# Patient Record
Sex: MALE | Race: BLACK OR AFRICAN AMERICAN | Employment: STUDENT | URBAN - METROPOLITAN AREA
[De-identification: names, ages, dates, MRNs, and addresses within clinical notes are randomized per-mention and may not be internally consistent; named-entity substitution may affect disease eponyms.]

---

## 2019-02-13 ENCOUNTER — HOSPITAL ENCOUNTER (EMERGENCY)
Facility: HOSPITAL | Age: 24
Discharge: HOME | End: 2019-02-13
Attending: EMERGENCY MEDICINE
Payer: MEDICARE

## 2019-02-13 VITALS
DIASTOLIC BLOOD PRESSURE: 64 MMHG | RESPIRATION RATE: 16 BRPM | WEIGHT: 145 LBS | OXYGEN SATURATION: 98 % | HEART RATE: 107 BPM | TEMPERATURE: 99 F | SYSTOLIC BLOOD PRESSURE: 130 MMHG

## 2019-02-13 DIAGNOSIS — J02.0 STREP PHARYNGITIS: Primary | ICD-10-CM

## 2019-02-13 LAB — S PYO AG THROAT QL: POSITIVE

## 2019-02-13 PROCEDURE — 99283 EMERGENCY DEPT VISIT LOW MDM: CPT

## 2019-02-13 PROCEDURE — 87430 STREP A AG IA: CPT

## 2019-02-13 RX ORDER — HYDROCODONE BITARTRATE AND ACETAMINOPHEN 7.5; 325 MG/1; MG/1
1 TABLET ORAL EVERY 6 HOURS PRN
COMMUNITY

## 2019-02-13 RX ORDER — AMOXICILLIN 875 MG/1
875 TABLET, COATED ORAL 2 TIMES DAILY
Qty: 14 TABLET | Refills: 0 | Status: SHIPPED | OUTPATIENT
Start: 2019-02-13 | End: 2019-02-20

## 2019-02-13 RX ORDER — PENICILLIN V POTASSIUM 500 MG/1
500 TABLET ORAL 4 TIMES DAILY
COMMUNITY

## 2019-02-13 RX ORDER — HYDROXYUREA 500 MG/1
30 CAPSULE ORAL DAILY
COMMUNITY

## 2019-02-13 RX ORDER — IBUPROFEN 600 MG/1
600 TABLET ORAL EVERY 8 HOURS PRN
Qty: 30 TABLET | Refills: 0 | Status: SHIPPED | OUTPATIENT
Start: 2019-02-13

## 2019-02-13 RX ORDER — NAPROXEN 500 MG/1
500 TABLET ORAL 2 TIMES DAILY WITH MEALS
COMMUNITY

## 2019-02-13 RX ORDER — MELATONIN
400 DAILY
COMMUNITY

## 2019-02-14 NOTE — ED NOTES
Pt presents to ed for sore throat and chills for the past 2 days. Pt tested positive for strep in triage.

## 2019-02-14 NOTE — ED PROVIDER NOTES
Patient Seen in: [facility]    History   Patient presents with:  Sore Throat      HPI    Patient presents to the ED complaining of a sore throat times 2 days. Pain is severe and constant, worse with swallowing.   No other comp Soft. He exhibits no distension. Neurological: He is alert and oriented to person, place, and time. Skin: Skin is warm and dry. Psychiatric: He has a normal mood and affect. His behavior is normal.   Nursing note and vitals reviewed.       ED Course Medication List as of 2/13/2019 10:46 PM    START taking these medications    amoxicillin 875 MG Oral Tab  Take 1 tablet (875 mg total) by mouth 2 (two) times daily for 7 days. , Print Script, Disp-14 tablet, R-0    ibuprofen 600 MG Oral Tab  Take 1 tablet

## (undated) NOTE — ED AVS SNAPSHOT
April Kasaan   MRN: [id number]    Department:  [facility] Emergency Department   Date of Visit:  2/13/2019           Disclosure     Insurance plans vary and the physician(s) referred by the ER may not be covered by your plan.  Please contact y within the next three months to obtain basic health screening including reassessment of your blood pressure.     IF THERE IS ANY CHANGE OR WORSENING OF YOUR CONDITION, CALL YOUR PRIMARY CARE PHYSICIAN AT ONCE OR RETURN IMMEDIATELY TO THE EMERGENCY DEPARTMEN

## (undated) NOTE — LETTER
Date & Time: 2/13/2019, 10:50 PM  Patient: Beau Sine  Encounter Provider(s):    Noris Fung MD       To Whom It May Concern:    Bernie Reesejose maria was seen and treated in our department on 2/13/2019. He should not return to school until 2/15/2019.